# Patient Record
Sex: FEMALE | Race: WHITE | NOT HISPANIC OR LATINO | Employment: UNEMPLOYED | URBAN - METROPOLITAN AREA
[De-identification: names, ages, dates, MRNs, and addresses within clinical notes are randomized per-mention and may not be internally consistent; named-entity substitution may affect disease eponyms.]

---

## 2022-11-12 ENCOUNTER — HOSPITAL ENCOUNTER (EMERGENCY)
Facility: HOSPITAL | Age: 22
Discharge: HOME | End: 2022-11-12
Attending: STUDENT IN AN ORGANIZED HEALTH CARE EDUCATION/TRAINING PROGRAM | Admitting: STUDENT IN AN ORGANIZED HEALTH CARE EDUCATION/TRAINING PROGRAM

## 2022-11-12 VITALS
WEIGHT: 245.59 LBS | BODY MASS INDEX: 43.52 KG/M2 | HEIGHT: 63 IN | TEMPERATURE: 98.7 F | RESPIRATION RATE: 22 BRPM | DIASTOLIC BLOOD PRESSURE: 61 MMHG | SYSTOLIC BLOOD PRESSURE: 114 MMHG | OXYGEN SATURATION: 99 % | HEART RATE: 94 BPM

## 2022-11-12 DIAGNOSIS — I26.99 OTHER ACUTE PULMONARY EMBOLISM WITHOUT ACUTE COR PULMONALE: Primary | ICD-10-CM

## 2022-11-12 LAB
ALBUMIN SERPL-MCNC: 3.6 G/DL (ref 3.5–5.2)
ALBUMIN/GLOB SERPL: 1.2 G/DL
ALP SERPL-CCNC: 112 U/L (ref 39–117)
ALT SERPL W P-5'-P-CCNC: 17 U/L (ref 1–33)
ANION GAP SERPL CALCULATED.3IONS-SCNC: 9.6 MMOL/L (ref 5–15)
AST SERPL-CCNC: 17 U/L (ref 1–32)
BASOPHILS # BLD AUTO: 0.02 10*3/MM3 (ref 0–0.2)
BASOPHILS NFR BLD AUTO: 0.3 % (ref 0–1.5)
BILIRUB SERPL-MCNC: 0.4 MG/DL (ref 0–1.2)
BUN SERPL-MCNC: 4 MG/DL (ref 6–20)
BUN/CREAT SERPL: 5.6 (ref 7–25)
CALCIUM SPEC-SCNC: 9.3 MG/DL (ref 8.6–10.5)
CHLORIDE SERPL-SCNC: 106 MMOL/L (ref 98–107)
CO2 SERPL-SCNC: 25.4 MMOL/L (ref 22–29)
CREAT SERPL-MCNC: 0.71 MG/DL (ref 0.57–1)
DEPRECATED RDW RBC AUTO: 44.2 FL (ref 37–54)
EGFRCR SERPLBLD CKD-EPI 2021: 122.7 ML/MIN/1.73
EOSINOPHIL # BLD AUTO: 0.46 10*3/MM3 (ref 0–0.4)
EOSINOPHIL NFR BLD AUTO: 5.8 % (ref 0.3–6.2)
ERYTHROCYTE [DISTWIDTH] IN BLOOD BY AUTOMATED COUNT: 13.8 % (ref 12.3–15.4)
GLOBULIN UR ELPH-MCNC: 2.9 GM/DL
GLUCOSE SERPL-MCNC: 98 MG/DL (ref 65–99)
HCT VFR BLD AUTO: 25.6 % (ref 34–46.6)
HGB BLD-MCNC: 8.3 G/DL (ref 12–15.9)
IMM GRANULOCYTES # BLD AUTO: 0.04 10*3/MM3 (ref 0–0.05)
IMM GRANULOCYTES NFR BLD AUTO: 0.5 % (ref 0–0.5)
LYMPHOCYTES # BLD AUTO: 1.29 10*3/MM3 (ref 0.7–3.1)
LYMPHOCYTES NFR BLD AUTO: 16.2 % (ref 19.6–45.3)
MCH RBC QN AUTO: 29 PG (ref 26.6–33)
MCHC RBC AUTO-ENTMCNC: 32.4 G/DL (ref 31.5–35.7)
MCV RBC AUTO: 89.5 FL (ref 79–97)
MONOCYTES # BLD AUTO: 0.62 10*3/MM3 (ref 0.1–0.9)
MONOCYTES NFR BLD AUTO: 7.8 % (ref 5–12)
NEUTROPHILS NFR BLD AUTO: 5.52 10*3/MM3 (ref 1.7–7)
NEUTROPHILS NFR BLD AUTO: 69.4 % (ref 42.7–76)
NRBC BLD AUTO-RTO: 0 /100 WBC (ref 0–0.2)
NT-PROBNP SERPL-MCNC: 165.1 PG/ML (ref 0–450)
PLATELET # BLD AUTO: 222 10*3/MM3 (ref 140–450)
PMV BLD AUTO: 9.1 FL (ref 6–12)
POTASSIUM SERPL-SCNC: 3.7 MMOL/L (ref 3.5–5.2)
PROT SERPL-MCNC: 6.5 G/DL (ref 6–8.5)
RBC # BLD AUTO: 2.86 10*6/MM3 (ref 3.77–5.28)
SODIUM SERPL-SCNC: 141 MMOL/L (ref 136–145)
TROPONIN T SERPL-MCNC: <0.01 NG/ML (ref 0–0.03)
WBC NRBC COR # BLD: 7.95 10*3/MM3 (ref 3.4–10.8)

## 2022-11-12 PROCEDURE — 36415 COLL VENOUS BLD VENIPUNCTURE: CPT

## 2022-11-12 PROCEDURE — 83880 ASSAY OF NATRIURETIC PEPTIDE: CPT | Performed by: STUDENT IN AN ORGANIZED HEALTH CARE EDUCATION/TRAINING PROGRAM

## 2022-11-12 PROCEDURE — 84484 ASSAY OF TROPONIN QUANT: CPT | Performed by: STUDENT IN AN ORGANIZED HEALTH CARE EDUCATION/TRAINING PROGRAM

## 2022-11-12 PROCEDURE — 99283 EMERGENCY DEPT VISIT LOW MDM: CPT

## 2022-11-12 PROCEDURE — 80053 COMPREHEN METABOLIC PANEL: CPT | Performed by: STUDENT IN AN ORGANIZED HEALTH CARE EDUCATION/TRAINING PROGRAM

## 2022-11-12 PROCEDURE — 85025 COMPLETE CBC W/AUTO DIFF WBC: CPT | Performed by: STUDENT IN AN ORGANIZED HEALTH CARE EDUCATION/TRAINING PROGRAM

## 2022-11-12 RX ORDER — OXYCODONE HYDROCHLORIDE 5 MG/1
5 TABLET ORAL
COMMUNITY
Start: 2022-11-11

## 2022-11-12 RX ORDER — FLUOXETINE 20 MG/1
20 TABLET ORAL DAILY
COMMUNITY
Start: 2022-11-02 | End: 2023-02-01

## 2022-11-12 RX ORDER — PANTOPRAZOLE SODIUM 40 MG/1
40 TABLET, DELAYED RELEASE ORAL
COMMUNITY
Start: 2022-11-12 | End: 2023-11-13

## 2022-11-12 RX ORDER — ONDANSETRON 4 MG/1
4 TABLET, ORALLY DISINTEGRATING ORAL
COMMUNITY
Start: 2022-11-11

## 2022-11-12 RX ORDER — AMOXICILLIN 250 MG
2 CAPSULE ORAL DAILY
Qty: 60 TABLET | Refills: 11 | COMMUNITY
Start: 2022-11-12 | End: 2023-11-13

## 2022-11-12 RX ORDER — NORGESTIMATE AND ETHINYL ESTRADIOL 0.25-0.035
1 KIT ORAL DAILY
Qty: 28 TABLET | Refills: 12 | COMMUNITY
Start: 2022-11-02 | End: 2023-11-03

## 2022-11-12 RX ADMIN — SODIUM CHLORIDE 1000 ML: 9 INJECTION, SOLUTION INTRAVENOUS at 19:26

## 2022-11-12 RX ADMIN — APIXABAN 10 MG: 5 TABLET, FILM COATED ORAL at 19:26

## 2022-11-12 NOTE — ED PROVIDER NOTES
Time: 6:31 PM EST    Chief Complaint: SOB  History provided by: patient  History is limited by: N/A     History of Present Illness:  Patient is a 22 y.o. year old female who presents to the emergency department with SOB. Pt is 7 weeks postpartum. Pt was admitted at [facility] on 11/08 when they found extensive left leg DVT.She had EKOS. Pt received eliquis 5 mg when she was discharged on 11/11.  Pt started getting SOB this morning 0300 so she went back to [facility] today at 1000. They found acute bilateral pulmonary emboli, involvement of the few bilateral segmental branches and increased eliquis to 10 mg BID. Pt was told to return if worse, SOB became worse so she came here. Pt has not taken the eliquis 10 mg yet. Pt took 5 mg Eliquis this morning between 0900 and 1000. Pt is following up with PCP on Friday. Pt reports dizziness.       Similar Symptoms Previously: no  Recently seen: no      Patient Care Team  Primary Care Provider: Jess Friend APRN    Past Medical History:     No Known Allergies  No past medical history on file.  No past surgical history on file.  No family history on file.    Home Medications:  Prior to Admission medications    Medication Sig Start Date End Date Taking? Authorizing Provider   apixaban (ELIQUIS) 5 MG tablet tablet Take 1 tablet by mouth. 11/12/22 12/3/22 Yes Provider, MD Vini   FLUoxetine HCl, PMDD, 20 MG tablet Take 20 mg by mouth Daily. 11/2/22 2/1/23 Yes ProviderVini MD   norgestimate-ethinyl estradiol (ORTHO-CYCLEN) 0.25-35 MG-MCG per tablet Take 1 tablet by mouth Daily. 11/2/22 11/3/23 Yes ProviderVini MD   ondansetron ODT (ZOFRAN-ODT) 4 MG disintegrating tablet Take 1 tablet by mouth. 11/11/22  Yes ProviderVini MD   oxyCODONE (ROXICODONE) 5 MG immediate release tablet Take 1 tablet by mouth. 11/11/22  Yes ProviderVini MD   pantoprazole (PROTONIX) 40 MG EC tablet Take 1 tablet by mouth. 11/12/22 11/13/23 Yes Provider  "MD Vini   sennosides-docusate (PERICOLACE) 8.6-50 MG per tablet Take 2 tablets by mouth Daily. 11/12/22 11/13/23 Yes Provider, MD Vini        Social History:          Review of Systems:  Review of Systems   Constitutional: Negative for chills and fever.   HENT: Negative for congestion, rhinorrhea and sore throat.    Eyes: Negative for pain and visual disturbance.   Respiratory: Positive for shortness of breath. Negative for apnea, cough and chest tightness.    Cardiovascular: Negative for chest pain and palpitations.   Gastrointestinal: Negative for abdominal pain, diarrhea, nausea and vomiting.   Genitourinary: Negative for difficulty urinating and dysuria.   Musculoskeletal: Negative for joint swelling and myalgias.   Skin: Negative for color change.   Neurological: Positive for dizziness. Negative for seizures and headaches.   Psychiatric/Behavioral: Negative.    All other systems reviewed and are negative.       Physical Exam:  /61 (BP Location: Right arm, Patient Position: Lying)   Pulse 94   Temp 98.7 °F (37.1 °C) (Oral)   Resp 22   Ht 160 cm (63\")   Wt 111 kg (245 lb 9.5 oz)   SpO2 99%   Breastfeeding No   BMI 43.50 kg/m²     Physical Exam  Vitals and nursing note reviewed.   Constitutional:       General: She is not in acute distress.     Appearance: Normal appearance. She is not toxic-appearing.   HENT:      Head: Normocephalic and atraumatic.      Jaw: There is normal jaw occlusion.   Eyes:      General: Lids are normal.      Extraocular Movements: Extraocular movements intact.      Conjunctiva/sclera: Conjunctivae normal.      Pupils: Pupils are equal, round, and reactive to light.   Cardiovascular:      Rate and Rhythm: Normal rate and regular rhythm.      Pulses: Normal pulses.      Heart sounds: Normal heart sounds.   Pulmonary:      Effort: Pulmonary effort is normal. No respiratory distress.      Breath sounds: Normal breath sounds. No wheezing or rhonchi.   Abdominal:     "  General: Abdomen is flat.      Palpations: Abdomen is soft.      Tenderness: There is no abdominal tenderness. There is no guarding or rebound.   Musculoskeletal:         General: Normal range of motion.      Cervical back: Normal range of motion and neck supple.      Right lower leg: No edema.      Left lower leg: No edema.   Skin:     General: Skin is warm and dry.   Neurological:      Mental Status: She is alert and oriented to person, place, and time. Mental status is at baseline.   Psychiatric:         Mood and Affect: Mood normal.                Medications in the Emergency Department:  Medications   sodium chloride 0.9 % bolus 1,000 mL (0 mL Intravenous Stopped 11/12/22 1956)   apixaban (ELIQUIS) tablet 10 mg (10 mg Oral Given 11/12/22 1926)        Labs  Lab Results (last 24 hours)     Procedure Component Value Units Date/Time    COMPREHENSIVE METABOLIC PANEL [[id number]]  (Abnormal) Collected: 11/12/22 1010    Specimen: Fresh Frozen Plasma Updated: 11/12/22 1802     Glucose 116 mg/dL      BUN 5 mg/dL      Creatinine 0.6 mg/dL      Sodium 143 mmol/L      Potassium 3.6 mmol/L      Chloride 108 mmol/L      Total CO2 24 mmol/L      Calcium 8.8 mg/dL      Protein 6.5 g/dL      Albumin 3.5 g/dL      Total Bilirubin 0.45 mg/dL      AST (SGOT) 16 U/L      ALT (SGPT) 15 U/L      Alkaline Phosphatase 101 U/L      Est GFR by Clearance 129 mL/min      Comment:     GFR    WITH KIDNEY DAMAGE     W/O DAMAGE  >=90         STAGE 1            NORMAL  60-89        STAGE 2            DEC GFR  30-59        STAGE 3            STAGE 3  15-29        STAGE 4            STAGE 4  <15          STAGE 5            STAGE 5      The formula is valid only for adults between age 18 and 70.        Anion Gap 11 mmol/L     CK [[id number]]  (Abnormal) Collected: 11/12/22 1010    Specimen: Fresh Frozen Plasma Updated: 11/12/22 1802     CKMB 27 U/L     MAGNESIUM [[id number]] Collected: 11/12/22 1010    Specimen: Fresh Frozen Plasma Updated: 11/12/22  1802     Magnesium 2.0 mg/dL     CONV BNP [[id number]] Collected: 11/12/22 1010     Updated: 11/12/22 1802     BNP 48.2 pg/mL     HCG, SERUM, QUALITATIVE [[id number]] Collected: 11/12/22 1010     Updated: 11/12/22 1802     HCG Quantitative NEG    CBC AND DIFFERENTIAL [[id number]]  (Abnormal) Collected: 11/12/22 1010     Updated: 11/12/22 1802    PROTIME-INR [[id number]] Collected: 11/12/22 1010     Updated: 11/12/22 1802     Protime 10.9 SEC      INR 1.07    APTT [[id number]] Collected: 11/12/22 1010     Updated: 11/12/22 1802     PTT 26.3 SEC     CONV TROPONIN I [[id number]] Collected: 11/12/22 1010     Updated: 11/12/22 1802     Troponin I <0.03 ng/mL     HEPARIN ANTI XA [[id number]]  (Abnormal) Collected: 11/12/22 1010     Updated: 11/12/22 1802    Blood Gas, Venous - [[id number]]  (Abnormal) Collected: 11/12/22 1027     Updated: 11/12/22 1802     pH 7.387     Total CO2 43.1 mmHg      O2 Saturation, Arterial 41.5 mmHg      Base Excess 0.2 mmol/L      Plasma Bicarbonate 25.3 mmol/L      O2 Saturation, Arterial 74.5 %      Hemoglobin 10.3 g/dL      Carbon Monoxide, Blood 1.9     Methemoglobin 0.3 %      Hematocrit 30 %      Comment SEE NOTES     Comment: rn drawn       CBC & Differential [[id number]]  (Abnormal) Collected: 11/12/22 1925    Specimen: Blood Updated: 11/12/22 1940    Narrative:      The following orders were created for panel order CBC & Differential.  Procedure                               Abnormality         Status                     ---------                               -----------         ------                     CBC Auto Differential[[id number]]        Abnormal            Final result                 Please view results for these tests on the individual orders.    Comprehensive Metabolic Panel [[id number]]  (Abnormal) Collected: 11/12/22 1925    Specimen: Blood Updated: 11/12/22 2012     Glucose 98 mg/dL      BUN 4 mg/dL      Creatinine 0.71 mg/dL      Sodium 141 mmol/L      Potassium 3.7 mmol/L       Chloride 106 mmol/L      CO2 25.4 mmol/L      Calcium 9.3 mg/dL      Total Protein 6.5 g/dL      Albumin 3.60 g/dL      ALT (SGPT) 17 U/L      AST (SGOT) 17 U/L      Alkaline Phosphatase 112 U/L      Total Bilirubin 0.4 mg/dL      Globulin 2.9 gm/dL      A/G Ratio 1.2 g/dL      BUN/Creatinine Ratio 5.6     Anion Gap 9.6 mmol/L      eGFR 122.7 mL/min/1.73      Comment: National Kidney Foundation and American Society of Nephrology (ASN) Task Force recommended calculation based on the Chronic Kidney Disease Epidemiology Collaboration (CKD-EPI) equation refit without adjustment for race.       Narrative:      GFR Normal >60  Chronic Kidney Disease <60  Kidney Failure <15      BNP [[id number]]  (Normal) Collected: 11/12/22 1925    Specimen: Blood Updated: 11/12/22 2008     proBNP 165.1 pg/mL     Narrative:      Among patients with dyspnea, NT-proBNP is highly sensitive for the detection of acute congestive heart failure. In addition NT-proBNP of <300 pg/ml effectively rules out acute congestive heart failure with 99% negative predictive value.      Troponin [[id number]]  (Normal) Collected: 11/12/22 1925    Specimen: Blood Updated: 11/12/22 2008     Troponin T <0.010 ng/mL     Narrative:      Troponin T Reference Range:  <= 0.03 ng/mL-   Negative for AMI  >0.03 ng/mL-     Abnormal for myocardial necrosis.  Clinicians would have to utilize clinical acumen, EKG, Troponin and serial changes to determine if it is an Acute Myocardial Infarction or myocardial injury due to an underlying chronic condition.       Results may be falsely decreased if patient taking Biotin.      CBC Auto Differential [[id number]]  (Abnormal) Collected: 11/12/22 1925    Specimen: Blood Updated: 11/12/22 1940     WBC 7.95 10*3/mm3      RBC 2.86 10*6/mm3      Hemoglobin 8.3 g/dL      Hematocrit 25.6 %      MCV 89.5 fL      MCH 29.0 pg      MCHC 32.4 g/dL      RDW 13.8 %      RDW-SD 44.2 fl      MPV 9.1 fL      Platelets 222 10*3/mm3      Neutrophil %  69.4 %      Lymphocyte % 16.2 %      Monocyte % 7.8 %      Eosinophil % 5.8 %      Basophil % 0.3 %      Immature Grans % 0.5 %      Neutrophils, Absolute 5.52 10*3/mm3      Lymphocytes, Absolute 1.29 10*3/mm3      Monocytes, Absolute 0.62 10*3/mm3      Eosinophils, Absolute 0.46 10*3/mm3      Basophils, Absolute 0.02 10*3/mm3      Immature Grans, Absolute 0.04 10*3/mm3      nRBC 0.0 /100 WBC            Imaging:  XR Chest 1 View    Result Date: 11/12/2022  CHEST X-RAY PORTABLE, SINGLE VIEW CLINICAL HISTORY:  22 years Female,NAUSEA, SHORTNESS OF BREATH COMPARISON:  None FINDINGS:  No focal airspace consolidation. No pleural effusion or pneumothorax.  Normal heart size and pulmonary vascularity.       No acute cardiopulmonary process. Workstation: [id number]    CTA Chest Pulmonary Embolism w/Contrast per Contrast Protocol    Result Date: 11/12/2022  EXAM: CT PULMONARY ARTERIOGRAPHY CLINICAL HISTORY:  22 years Female,Pulmonary embolism (PE) suspected, high prob, has DVT left leg; R/O PE COMPARISON:  Chest x-ray from earlier today TECHNIQUE: Axial with coronal reformatted images of the entire chest and oblique coronal reformatted sliding MIP of the pulmonary arteries were obtained with intravenous administration of 75 mL Omnipaque 350 using the CT pulmonary arteriography protocol.  Images were reviewed at vascular, mediastinal, lung, and bone window settings.  This examination was performed according to our departmental dose optimization program which includes automated exposure control, adjustment of the MA and kV according to patient size, and/or use of iterative reconstruction technique. FINDINGS: There are acute pulmonary emboli involving a few bilateral segmental pulmonary artery branches. No CT evidence of associated right heart strain, as the right ventricular to left ventricular diameter ratio measures 0.9. No focal airspace consolidation to suggest pneumonia. No pleural effusion or pneumothorax. The central  airways are patent. No bronchiectasis. No thoracic lymphadenopathy. No acute or suspicious osseous abnormality.       1. Acute bilateral pulmonary emboli with involvement of a few bilateral segmental pulmonary artery branches. 2. These findings were discussed with Dr. Graves by telephone at 10:55 AM on 11/12/2022. Workstation: [id number]      Procedures:  Procedures    Progress                            Medical Decision Making:  MDM  Number of Diagnoses or Management Options  Other acute pulmonary embolism without acute cor pulmonale (HCC)  Diagnosis management comments: Patient oxygenating 100% on room air no respiratory distress.  Heart rate 100.  I did discuss the case with Dr. Unger from pulmonology critical care who states without any right heart strain on CT PE done today patient can go home on Eliquis 10 mg twice daily which she is currently prescribed.  I did repeat her labs troponin normal BNP normal.  Hemoglobin earlier today 9 currently 8.6.  Discussed with her partner mother about watching symptoms very closely at home being very compliant with the Eliquis and very strict return precautions.       Amount and/or Complexity of Data Reviewed  Clinical lab tests: reviewed  Tests in the radiology section of CPT®: reviewed  Review and summarize past medical records: yes  Discuss the patient with other providers: yes         Final diagnoses:   Other acute pulmonary embolism without acute cor pulmonale (HCC)        Disposition:  ED Disposition     ED Disposition   Discharge    Condition   Stable    Comment   --             This medical record created using voice recognition software.        Documentation assistance provided by Jimmy Santoyo acting as scribe for No att. providers found. Information recorded by the scribe was done at my direction and has been verified and validated by me.          Jimmy Santoyo  11/12/22 1910       Kat Palumbo MD  11/12/22 [id number]

## 2022-11-13 NOTE — ED NOTES
Patient alert and oriented x4, answering questions appropriately.  Patient family at bedside.  Good mood and affect.

## 2022-11-13 NOTE — DISCHARGE INSTRUCTIONS
It is very important you watch her symptoms closely from home.  Continue to take Eliquis 10 mg twice a day.  If you have worsening shortness of breath or pass out, return to the emergency department.  Follow-up closely with your vascular doctor and your primary care doctor.